# Patient Record
Sex: FEMALE | Race: WHITE | URBAN - METROPOLITAN AREA
[De-identification: names, ages, dates, MRNs, and addresses within clinical notes are randomized per-mention and may not be internally consistent; named-entity substitution may affect disease eponyms.]

---

## 2023-04-28 PROBLEM — R00.2 HEART PALPITATIONS: Status: ACTIVE | Noted: 2023-04-28

## 2023-04-28 PROBLEM — R12 HEARTBURN: Status: ACTIVE | Noted: 2023-04-28

## 2024-06-26 DIAGNOSIS — N90.89 VULVAR LESION: Primary | ICD-10-CM

## 2024-07-05 ENCOUNTER — APPOINTMENT (OUTPATIENT)
Dept: HEMATOLOGY/ONCOLOGY | Facility: HOSPITAL | Age: 34
End: 2024-07-05
Payer: COMMERCIAL

## 2024-07-11 ENCOUNTER — LAB REQUISITION (OUTPATIENT)
Dept: LAB | Facility: HOSPITAL | Age: 34
End: 2024-07-11
Payer: COMMERCIAL

## 2024-07-11 PROCEDURE — 88321 CONSLTJ&REPRT SLD PREP ELSWR: CPT | Performed by: PATHOLOGY

## 2024-07-12 LAB
LABORATORY COMMENT REPORT: NORMAL
PATH REPORT.FINAL DX SPEC: NORMAL
PATH REPORT.GROSS SPEC: NORMAL
PATH REPORT.TOTAL CANCER: NORMAL

## 2024-07-22 ENCOUNTER — OFFICE VISIT (OUTPATIENT)
Dept: GYNECOLOGIC ONCOLOGY | Facility: CLINIC | Age: 34
End: 2024-07-22
Payer: COMMERCIAL

## 2024-07-22 VITALS
RESPIRATION RATE: 16 BRPM | HEIGHT: 69 IN | DIASTOLIC BLOOD PRESSURE: 74 MMHG | TEMPERATURE: 97.7 F | OXYGEN SATURATION: 96 % | HEART RATE: 72 BPM | BODY MASS INDEX: 29.71 KG/M2 | SYSTOLIC BLOOD PRESSURE: 115 MMHG | WEIGHT: 200.62 LBS

## 2024-07-22 DIAGNOSIS — N90.89 VULVAR LESION: Primary | ICD-10-CM

## 2024-07-22 PROCEDURE — 99214 OFFICE O/P EST MOD 30 MIN: CPT | Performed by: OBSTETRICS & GYNECOLOGY

## 2024-07-22 PROCEDURE — 1036F TOBACCO NON-USER: CPT | Performed by: OBSTETRICS & GYNECOLOGY

## 2024-07-22 PROCEDURE — 3008F BODY MASS INDEX DOCD: CPT | Performed by: OBSTETRICS & GYNECOLOGY

## 2024-07-22 PROCEDURE — 99204 OFFICE O/P NEW MOD 45 MIN: CPT | Performed by: OBSTETRICS & GYNECOLOGY

## 2024-07-22 ASSESSMENT — PAIN SCALES - GENERAL: PAINLEVEL: 0-NO PAIN

## 2024-07-22 NOTE — PROGRESS NOTES
"Patient ID: Justine Terrazas is a 33 y.o. female.  Referring Physician: No referring provider defined for this encounter.  Primary Care Provider: Sabrina Buckner DO      Subjective    HPI    33 y.o. presenting for GYN Oncology initial visit. 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    States she has had a \"bump\" under the skin on the L labia majora for approximately 3 years. It was stable in size until recently when she felt the size increased. It has never had cutaneous signs, never drained or had an external component. Her OB/GYN removed the lesion with an uncomplicated in-office procedure in May 2024. States she minimally feels any tissue at the site of the biopsy and it has well healed.    Reports hx abnormal paps a few years ago after the birth of her youngest son. States her pap was abnormal and requiring colposcopy with biopsies. The results of this are not available on care everywhere, but states she required 6mo follow up pap which was normal. Has had normal pap smears since then.    OB Hx: P2,  x2  Medical History: denies  Surgical History: hernia surgery on right  Family History: denies history of breast, ovarian, uterine cancers.       Review of Systems   All other systems reviewed and are negative.       Objective   BSA: 2.11 meters squared  /74 (BP Location: Right arm, Patient Position: Sitting, BP Cuff Size: Adult)   Pulse 72   Temp 36.5 °C (97.7 °F) (Temporal)   Resp 16   Ht (S) 1.764 m (5' 9.45\")   Wt 91 kg (200 lb 9.9 oz)   SpO2 96%   BMI 29.24 kg/m²      Family History   Problem Relation Name Age of Onset    Heart disease Mother      Nephrolithiasis Mother      Hypertension Mother      Hypertension Father      Diabetes type II Father      Deep vein thrombosis Father      Nephrolithiasis Mother's Brother      Hypertension Father's Sister      Hypertension Father's Brother      Diabetes type II Maternal " Grandmother      Hypertension Paternal Grandmother      Hypertension Paternal Grandfather      Diabetes type II Paternal Grandfather         Justine Terrazas  reports that she has never smoked. She has never used smokeless tobacco.  She  reports current alcohol use of about 2.0 standard drinks of alcohol per week.  She  reports no history of drug use.    Physical Exam  Constitutional:       Appearance: Normal appearance.   HENT:      Head: Normocephalic and atraumatic.   Eyes:      Extraocular Movements: Extraocular movements intact.   Pulmonary:      Effort: Pulmonary effort is normal.   Genitourinary:     General: Normal vulva.      Exam position: Lithotomy position.          Comments: Well healed biopsy site on internal component of L labium majus. No palpable masses. Location of previous lesion outlined in red.  Skin:     General: Skin is warm and dry.   Neurological:      General: No focal deficit present.      Mental Status: She is alert and oriented to person, place, and time.   Psychiatric:         Mood and Affect: Mood normal.         Behavior: Behavior normal.         Performance Status:  Asymptomatic    Assessment/Plan      Oncology History    No history exists.     33 y.o. 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    - Mass excised May 2024, exam benign and reassuring.   - Pathology reporting hidradenoma papilliferum with high mitotic activity - this is a benign lesion of the sweat gland.   - Given high mitotic activity on pathology report, will follow up in 4 months. Currently no indication for re-excision given normal exam today  - Discussed if mass were to return, she should notify us for sooner follow up    Seen and discussed with Dr. Reid.   Haley Beal MD PGY-2

## 2024-08-26 ENCOUNTER — OFFICE VISIT (OUTPATIENT)
Dept: GYNECOLOGIC ONCOLOGY | Facility: CLINIC | Age: 34
End: 2024-08-26
Payer: COMMERCIAL

## 2024-08-26 ENCOUNTER — HOSPITAL ENCOUNTER (OUTPATIENT)
Facility: CLINIC | Age: 34
Setting detail: OUTPATIENT SURGERY
End: 2024-08-26
Attending: OBSTETRICS & GYNECOLOGY | Admitting: OBSTETRICS & GYNECOLOGY
Payer: COMMERCIAL

## 2024-08-26 VITALS
WEIGHT: 204.37 LBS | HEART RATE: 82 BPM | SYSTOLIC BLOOD PRESSURE: 120 MMHG | RESPIRATION RATE: 16 BRPM | DIASTOLIC BLOOD PRESSURE: 81 MMHG | OXYGEN SATURATION: 98 % | TEMPERATURE: 98.6 F | BODY MASS INDEX: 29.79 KG/M2

## 2024-08-26 DIAGNOSIS — N90.89 VULVAR LESION: Primary | ICD-10-CM

## 2024-08-26 PROCEDURE — 99213 OFFICE O/P EST LOW 20 MIN: CPT | Performed by: OBSTETRICS & GYNECOLOGY

## 2024-08-26 PROCEDURE — 1036F TOBACCO NON-USER: CPT | Performed by: OBSTETRICS & GYNECOLOGY

## 2024-08-26 ASSESSMENT — PAIN SCALES - GENERAL: PAINLEVEL: 0-NO PAIN

## 2024-08-26 NOTE — PROGRESS NOTES
Patient ID: Justine Terrazas is a 33 y.o. female.  Referring Physician: No referring provider defined for this encounter.  Primary Care Provider: Sabrina Buckner DO    Subjective    33 y.o. presenting for GYN Oncology initial visit. 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         OB Hx: P2,  x2  Medical History: denies  Surgical History: hernia surgery on right  Family History: denies history of breast, ovarian, uterine cancers.   Interval History:  She reports a new mass below her other one and near scar tissue, she feels pain with palpation with her last pelvic mass she felt pain when wearing jeans and during exercise.      Objective    BSA: 2.13 meters squared  /81 (BP Location: Right arm, Patient Position: Sitting, BP Cuff Size: Adult)   Pulse 82   Temp 37 °C (98.6 °F) (Temporal)   Resp 16   Wt 92.7 kg (204 lb 5.9 oz)   SpO2 98%   BMI 29.79 kg/m²      Physical Exam  Constitutional:       General: She is not in acute distress.     Appearance: Normal appearance.   Cardiovascular:      Rate and Rhythm: Normal rate and regular rhythm.   Pulmonary:      Effort: Pulmonary effort is normal.      Breath sounds: Normal breath sounds.   Abdominal:      General: Bowel sounds are normal. There is no distension.   Genitourinary:         Comments: Very small tender lesion on Labia minora - close to biopsy site, approx 7mm in size.  Most consistent with scar.  Musculoskeletal:      Right forearm: Normal.      Left forearm: Normal.      Right hand: Normal.      Left hand: Normal.      Right lower leg: Normal.      Left lower leg: Normal.      Right foot: Normal.      Left foot: Normal.         Performance Status:  Asymptomatic    Assessment/Plan     Oncology History    No history exists.        Problem List Items Addressed This Visit             ICD-10-CM    Vulvar lesion - Primary N90.89    Relevant Orders    Case Request  Operating Room: vulvar wide local excision (Completed)       Treatment Plans       No treatment plans exist        - Performed pelvic examination in office today without complications, plan for WLE of scar tissue on [facility].  Labs, no PAT.  - Follow up for surgery with Dr. Candis Reid.       Scribe Attestation  By signing my name below, I, Saira Todd   attest that this documentation has been prepared under the direction and in the presence of Candis Reid MD.

## 2024-09-22 NOTE — PROGRESS NOTES
"Subjective   Patient ID: Justine Terrazas is a 33 y.o. female who presents for Annual Exam (Sees GYN for exams.  She is not fasting. Defers flu shot).    HPI     32yo female presents for a physical.     last seen 12/2021     LMP 8/8; just took a preg test yesterday and it was +; has appt scheduled with her OB; has been taking a PNV;  got  earlier this yr.  sees gyn for exams. dr ford.   Hx of vulvar lesion- scheduled for upcoming surgery but just found out she's pregnant    has 2 kids (2014 and 2016) no complications     immunizations:   tdap-unsure of her last one  flu shot-defers  covid shots- defers     BMI 29  has been trying to eat healthy. no reg exercise.   12/2021  lipids  were good  +fam hx of HLD/HTN/heart issues.       Due to see optho   sees her dentist for regular cleanings     She denies any chest pains, edema, shortness of breath, abdominal pains, nausea, vomiting, diarrhea, constipation, blood in stool, melena.   occ reflux- uses tums prn  had EGD 10/2020    Hx of on and off palpitations  +anxiety  Had holter 12/2021       Review of Systems  ROS as stated in HPI    Objective   /77   Pulse 82   Temp 37.1 °C (98.8 °F)   Ht 1.778 m (5' 10\")   Wt 93 kg (205 lb)   BMI 29.41 kg/m²     Physical Exam  Constitutional: A&O; NAD  HEENT: conjunctiva normal. EOMI; PERRLA; lids normal; TM's clear;   Neck: supple; No lymphadenopathy   Heart: RRR     Lungs: CTA bilateral   Abd: Soft, Nontender, Nondistended  Ext:  No edema;    Neuro: No gross neuro deficits     Psych: Judgment, orientation, mood, affect, insight wnl   Assessment/Plan   Problem List Items Addressed This Visit    None  Visit Diagnoses         Codes    Healthcare maintenance    -  Primary Z00.00    Relevant Orders    CBC    Comprehensive Metabolic Panel    Hemoglobin A1C    Lipid Panel    TSH with reflex to Free T4 if abnormal          Fu with OB +pregnant   defers flu shot or tetanus booster or covid shots.   healthy lifestyle-diet, " exercise                     .   check labs

## 2024-09-23 ENCOUNTER — APPOINTMENT (OUTPATIENT)
Dept: PRIMARY CARE | Facility: CLINIC | Age: 34
End: 2024-09-23
Payer: COMMERCIAL

## 2024-09-23 VITALS
SYSTOLIC BLOOD PRESSURE: 114 MMHG | BODY MASS INDEX: 29.35 KG/M2 | DIASTOLIC BLOOD PRESSURE: 77 MMHG | WEIGHT: 205 LBS | HEART RATE: 82 BPM | TEMPERATURE: 98.8 F | HEIGHT: 70 IN

## 2024-09-23 DIAGNOSIS — Z00.00 HEALTHCARE MAINTENANCE: Primary | ICD-10-CM

## 2024-09-23 PROCEDURE — 99395 PREV VISIT EST AGE 18-39: CPT | Performed by: FAMILY MEDICINE

## 2024-09-23 PROCEDURE — 3008F BODY MASS INDEX DOCD: CPT | Performed by: FAMILY MEDICINE

## 2024-09-23 PROCEDURE — 1036F TOBACCO NON-USER: CPT | Performed by: FAMILY MEDICINE

## 2024-09-23 ASSESSMENT — PATIENT HEALTH QUESTIONNAIRE - PHQ9
2. FEELING DOWN, DEPRESSED OR HOPELESS: NOT AT ALL
1. LITTLE INTEREST OR PLEASURE IN DOING THINGS: NOT AT ALL
SUM OF ALL RESPONSES TO PHQ9 QUESTIONS 1 AND 2: 0

## 2024-09-24 ENCOUNTER — LAB (OUTPATIENT)
Dept: LAB | Facility: LAB | Age: 34
End: 2024-09-24
Payer: COMMERCIAL

## 2024-09-24 DIAGNOSIS — Z00.00 HEALTHCARE MAINTENANCE: ICD-10-CM

## 2024-09-24 LAB
ALBUMIN SERPL BCP-MCNC: 3.9 G/DL (ref 3.4–5)
ALP SERPL-CCNC: 44 U/L (ref 33–110)
ALT SERPL W P-5'-P-CCNC: 27 U/L (ref 7–45)
ANION GAP SERPL CALC-SCNC: 12 MMOL/L (ref 10–20)
AST SERPL W P-5'-P-CCNC: 18 U/L (ref 9–39)
BILIRUB SERPL-MCNC: 0.6 MG/DL (ref 0–1.2)
BUN SERPL-MCNC: 12 MG/DL (ref 6–23)
CALCIUM SERPL-MCNC: 9.2 MG/DL (ref 8.6–10.6)
CHLORIDE SERPL-SCNC: 106 MMOL/L (ref 98–107)
CHOLEST SERPL-MCNC: 158 MG/DL (ref 0–199)
CHOLESTEROL/HDL RATIO: 3.2
CO2 SERPL-SCNC: 24 MMOL/L (ref 21–32)
CREAT SERPL-MCNC: 0.78 MG/DL (ref 0.5–1.05)
EGFRCR SERPLBLD CKD-EPI 2021: >90 ML/MIN/1.73M*2
ERYTHROCYTE [DISTWIDTH] IN BLOOD BY AUTOMATED COUNT: 12.3 % (ref 11.5–14.5)
EST. AVERAGE GLUCOSE BLD GHB EST-MCNC: 91 MG/DL
GLUCOSE SERPL-MCNC: 88 MG/DL (ref 74–99)
HBA1C MFR BLD: 4.8 %
HCT VFR BLD AUTO: 40.3 % (ref 36–46)
HDLC SERPL-MCNC: 49.6 MG/DL
HGB BLD-MCNC: 13.9 G/DL (ref 12–16)
LDLC SERPL CALC-MCNC: 80 MG/DL
MCH RBC QN AUTO: 31.6 PG (ref 26–34)
MCHC RBC AUTO-ENTMCNC: 34.5 G/DL (ref 32–36)
MCV RBC AUTO: 92 FL (ref 80–100)
NON HDL CHOLESTEROL: 108 MG/DL (ref 0–149)
NRBC BLD-RTO: 0 /100 WBCS (ref 0–0)
PLATELET # BLD AUTO: 197 X10*3/UL (ref 150–450)
POTASSIUM SERPL-SCNC: 3.9 MMOL/L (ref 3.5–5.3)
PROT SERPL-MCNC: 6.2 G/DL (ref 6.4–8.2)
RBC # BLD AUTO: 4.4 X10*6/UL (ref 4–5.2)
SODIUM SERPL-SCNC: 138 MMOL/L (ref 136–145)
TRIGL SERPL-MCNC: 141 MG/DL (ref 0–149)
TSH SERPL-ACNC: 2 MIU/L (ref 0.44–3.98)
VLDL: 28 MG/DL (ref 0–40)
WBC # BLD AUTO: 10.1 X10*3/UL (ref 4.4–11.3)

## 2024-09-24 PROCEDURE — 84443 ASSAY THYROID STIM HORMONE: CPT

## 2024-09-24 PROCEDURE — 36415 COLL VENOUS BLD VENIPUNCTURE: CPT

## 2024-09-24 PROCEDURE — 80053 COMPREHEN METABOLIC PANEL: CPT

## 2024-09-24 PROCEDURE — 85027 COMPLETE CBC AUTOMATED: CPT

## 2024-09-24 PROCEDURE — 83036 HEMOGLOBIN GLYCOSYLATED A1C: CPT

## 2024-09-24 PROCEDURE — 80061 LIPID PANEL: CPT

## 2024-09-26 ENCOUNTER — TELEPHONE (OUTPATIENT)
Dept: PRIMARY CARE | Facility: CLINIC | Age: 34
End: 2024-09-26
Payer: COMMERCIAL

## 2024-09-26 NOTE — TELEPHONE ENCOUNTER
----- Message from Sabrina Buckner sent at 9/25/2024  1:19 PM EDT -----  Please let pt know that her blood counts, sugar, electrolytes, thyroid, liver, and kidney function are all normal and her cholesterol was good.

## 2024-09-30 ENCOUNTER — TELEPHONE (OUTPATIENT)
Dept: GYNECOLOGIC ONCOLOGY | Facility: HOSPITAL | Age: 34
End: 2024-09-30
Payer: COMMERCIAL

## 2024-09-30 NOTE — TELEPHONE ENCOUNTER
Patient called to update that she is 5 weeks pregnant.   Patient is scheduled for WLE of labia on 10/14/24 with Dr. Reid.     Patient asking if ok to proceed with surgery.   Message routed to Dr. Reid.        [id number]  Phoned patient to notify that Dr. Reid recommends either delaying surgery until patients second trimester or reschedule surgery for after pregnancy.    Message left on patient voice mail requesting a return phone call.

## 2024-10-02 ENCOUNTER — TELEPHONE (OUTPATIENT)
Dept: GYNECOLOGIC ONCOLOGY | Facility: HOSPITAL | Age: 34
End: 2024-10-02
Payer: COMMERCIAL

## 2024-10-02 NOTE — TELEPHONE ENCOUNTER
Patient called requesting update on 10/14 surgery plan and Dr. Johnson recommendation as patient is 5 weeks pregnant.     Phoned patient to notify that Dr. Reid recommends rescheduling surgery for second trimester or rescheduling surgery for after patient has delivered.    Message left on patient voice mail with Dr. Johnson recommendation.

## 2024-10-04 ENCOUNTER — TELEPHONE (OUTPATIENT)
Dept: GYNECOLOGIC ONCOLOGY | Facility: HOSPITAL | Age: 34
End: 2024-10-04
Payer: COMMERCIAL

## 2024-10-04 NOTE — TELEPHONE ENCOUNTER
Patient returned nurse call. Notified patient that Dr. Reid recommends cancelling 10/14 surgery as patient is pregnant. Dr. Reid recommends rescheduling procedure of second trimester or following delivery.     Patient states she will update office following delivery to reschedule surgery.

## 2024-11-04 ENCOUNTER — APPOINTMENT (OUTPATIENT)
Dept: ENDOCRINOLOGY | Facility: CLINIC | Age: 34
End: 2024-11-04
Payer: COMMERCIAL

## 2024-11-18 ENCOUNTER — APPOINTMENT (OUTPATIENT)
Dept: GYNECOLOGIC ONCOLOGY | Facility: CLINIC | Age: 34
End: 2024-11-18
Payer: COMMERCIAL

## 2025-01-16 ENCOUNTER — APPOINTMENT (OUTPATIENT)
Dept: ENDOCRINOLOGY | Facility: CLINIC | Age: 35
End: 2025-01-16
Payer: COMMERCIAL

## 2025-09-25 ENCOUNTER — APPOINTMENT (OUTPATIENT)
Dept: PRIMARY CARE | Facility: CLINIC | Age: 35
End: 2025-09-25
Payer: COMMERCIAL